# Patient Record
Sex: MALE | Race: WHITE
[De-identification: names, ages, dates, MRNs, and addresses within clinical notes are randomized per-mention and may not be internally consistent; named-entity substitution may affect disease eponyms.]

---

## 2020-11-16 ENCOUNTER — HOSPITAL ENCOUNTER (EMERGENCY)
Dept: HOSPITAL 54 - ER | Age: 80
Discharge: HOME | End: 2020-11-16
Payer: MEDICARE

## 2020-11-16 VITALS — WEIGHT: 169 LBS | BODY MASS INDEX: 25.61 KG/M2 | HEIGHT: 68 IN

## 2020-11-16 VITALS — SYSTOLIC BLOOD PRESSURE: 130 MMHG | DIASTOLIC BLOOD PRESSURE: 75 MMHG

## 2020-11-16 DIAGNOSIS — N40.0: ICD-10-CM

## 2020-11-16 DIAGNOSIS — E03.9: ICD-10-CM

## 2020-11-16 DIAGNOSIS — I10: ICD-10-CM

## 2020-11-16 DIAGNOSIS — F03.90: Primary | ICD-10-CM

## 2020-11-16 DIAGNOSIS — E11.9: ICD-10-CM

## 2020-11-16 NOTE — NUR
[facility] AND [employer] FOUND ON THE STRE[address] WALKING AROUND. PT IS CONFUSED BUT ABLE TO 
FOLLOW COMMANDS. NO MEDICAL COMPLAINTS. PT STATES THAT IT WAS HOT WALKING 
OUTSIDE. NOT NOTED VISUAL INJURY. MD WAS AT THE BEDSIDE FOR EVAL AND  TALKING 
TO PT'S DAUGHTER

## 2020-11-16 NOTE — NUR
Patient discharged to home in stable condition under the care of pt's marleen. 
Written and verbal after care instructions given. Patient verbalizes 
understanding of instruction. Pt ambulatory with a steady gait w/ daughter by 
thept's side